# Patient Record
Sex: MALE | Race: WHITE | NOT HISPANIC OR LATINO | URBAN - METROPOLITAN AREA
[De-identification: names, ages, dates, MRNs, and addresses within clinical notes are randomized per-mention and may not be internally consistent; named-entity substitution may affect disease eponyms.]

---

## 2017-04-10 ENCOUNTER — OUTPATIENT (OUTPATIENT)
Dept: OUTPATIENT SERVICES | Facility: HOSPITAL | Age: 62
LOS: 1 days | End: 2017-04-10
Payer: OTHER MISCELLANEOUS

## 2017-04-10 VITALS
RESPIRATION RATE: 16 BRPM | DIASTOLIC BLOOD PRESSURE: 80 MMHG | TEMPERATURE: 97 F | HEIGHT: 62 IN | WEIGHT: 162.04 LBS | SYSTOLIC BLOOD PRESSURE: 122 MMHG | HEART RATE: 61 BPM

## 2017-04-10 DIAGNOSIS — Z98.89 OTHER SPECIFIED POSTPROCEDURAL STATES: Chronic | ICD-10-CM

## 2017-04-10 DIAGNOSIS — I10 ESSENTIAL (PRIMARY) HYPERTENSION: ICD-10-CM

## 2017-04-10 DIAGNOSIS — M75.121 COMPLETE ROTATOR CUFF TEAR OR RUPTURE OF RIGHT SHOULDER, NOT SPECIFIED AS TRAUMATIC: ICD-10-CM

## 2017-04-10 DIAGNOSIS — M66.819: ICD-10-CM

## 2017-04-10 LAB
BUN SERPL-MCNC: 26 MG/DL — HIGH (ref 7–23)
CALCIUM SERPL-MCNC: 9.8 MG/DL — SIGNIFICANT CHANGE UP (ref 8.4–10.5)
CHLORIDE SERPL-SCNC: 102 MMOL/L — SIGNIFICANT CHANGE UP (ref 98–107)
CO2 SERPL-SCNC: 26 MMOL/L — SIGNIFICANT CHANGE UP (ref 22–31)
CREAT SERPL-MCNC: 0.91 MG/DL — SIGNIFICANT CHANGE UP (ref 0.5–1.3)
GLUCOSE SERPL-MCNC: 106 MG/DL — HIGH (ref 70–99)
HCT VFR BLD CALC: 48.6 % — SIGNIFICANT CHANGE UP (ref 39–50)
HGB BLD-MCNC: 16.6 G/DL — SIGNIFICANT CHANGE UP (ref 13–17)
MCHC RBC-ENTMCNC: 32.4 PG — SIGNIFICANT CHANGE UP (ref 27–34)
MCHC RBC-ENTMCNC: 34.2 % — SIGNIFICANT CHANGE UP (ref 32–36)
MCV RBC AUTO: 94.7 FL — SIGNIFICANT CHANGE UP (ref 80–100)
PLATELET # BLD AUTO: 192 K/UL — SIGNIFICANT CHANGE UP (ref 150–400)
PMV BLD: 12 FL — SIGNIFICANT CHANGE UP (ref 7–13)
POTASSIUM SERPL-MCNC: 4.3 MMOL/L — SIGNIFICANT CHANGE UP (ref 3.5–5.3)
POTASSIUM SERPL-SCNC: 4.3 MMOL/L — SIGNIFICANT CHANGE UP (ref 3.5–5.3)
RBC # BLD: 5.13 M/UL — SIGNIFICANT CHANGE UP (ref 4.2–5.8)
RBC # FLD: 14 % — SIGNIFICANT CHANGE UP (ref 10.3–14.5)
SODIUM SERPL-SCNC: 144 MMOL/L — SIGNIFICANT CHANGE UP (ref 135–145)
WBC # BLD: 7.36 K/UL — SIGNIFICANT CHANGE UP (ref 3.8–10.5)
WBC # FLD AUTO: 7.36 K/UL — SIGNIFICANT CHANGE UP (ref 3.8–10.5)

## 2017-04-10 PROCEDURE — 93010 ELECTROCARDIOGRAM REPORT: CPT

## 2017-04-10 NOTE — H&P PST ADULT - NEUROLOGICAL DETAILS
responds to verbal commands/alert and oriented x 3/sensation intact/responds to pain/normal strength

## 2017-04-10 NOTE — H&P PST ADULT - NEGATIVE ENMT SYMPTOMS
no hearing difficulty/no vertigo/no gum bleeding/no dysphagia/no throat pain/no nasal discharge/no sinus symptoms/no ear pain/no nasal congestion/no nasal obstruction/no tinnitus

## 2017-04-10 NOTE — H&P PST ADULT - LOCATION OF BACK PAIN
Pt c/o of chronic lower back pain - herniated disc L4-5 - with occasional radiation to right leg/lumbar spine Pt c/o of chronic lower back pain - herniated disc L4-5 - with radiation to right leg/lumbar spine

## 2017-04-10 NOTE — H&P PST ADULT - PROBLEM SELECTOR PLAN 1
Pt given pre-op instructions and chlorhexidine and to take own GI protection.   OR booking notified of Valium allergy and MADELIN precautions via fax.   Stress and Echo from 2016 requested.

## 2017-04-10 NOTE — H&P PST ADULT - MUSCULOSKELETAL COMMENTS
Pt hx of chronic shoulder pain and s/p right shoulder rotator cuff repair 11/16 with continued pain and difficulty raising arm Chronic shoulder pain with reduced ROM and lower back pain with radiation to right leg and mild weakness

## 2017-04-10 NOTE — H&P PST ADULT - HISTORY OF PRESENT ILLNESS
Pt is a 61 yr old male scheduled for right shoulder Arthroscopy Rotator Cuff repair with Dr Salas 4/17/17. Pt S/P right shoulder surgery 11/17 and continued to have pain after PT and especially at night. Pt has difficulty with raising arm and reaching to back. Pain is 6/10 with movement. Pt Hx of chronic lower back pain 6/10 with occasional radiation to right leg

## 2017-04-10 NOTE — H&P PST ADULT - OPHTHALMOLOGIC COMMENTS
Hx of loss of vision right eye at age 9 - sees only "shadows" - Left eye vision is corrected to 20/20

## 2017-04-10 NOTE — H&P PST ADULT - NEGATIVE NEUROLOGICAL SYMPTOMS
no tremors/no weakness/no headache/no generalized seizures/no syncope/no transient paralysis/no loss of consciousness/no difficulty walking/no confusion/no hemiparesis/no focal seizures/no paresthesias no loss of consciousness/no tremors/no hemiparesis/no transient paralysis/no syncope/no difficulty walking/no generalized seizures/no focal seizures/no confusion/no headache/no paresthesias

## 2017-04-10 NOTE — H&P PST ADULT - PMH
GERD (gastroesophageal reflux disease)    Hiatal hernia    Hyperlipemia    Hypertension    Spontaneous rupture of tendon of shoulder

## 2017-04-10 NOTE — H&P PST ADULT - CARDIOVASCULAR COMMENTS
Pt had Stress/ Echo and angiogram last fall and cleared for surgery 11/16 - pt denies chest pain or SOB

## 2017-04-10 NOTE — H&P PST ADULT - PSH
H/O hammer toe correction  right foot 2010  History of Non-Cataract Eye Surgery  right eye surgery as a child X2, (only see shadow)  S/P rotator cuff repair  right 1996, 1997, left 2010 H/O hammer toe correction  right foot 2010  History of Non-Cataract Eye Surgery  right eye surgery as a child X2, (only see shadow)  S/P rotator cuff repair  right 1996, 1997, 2016 left 2010

## 2017-04-10 NOTE — H&P PST ADULT - NSANTHOSAYNRD_GEN_A_CORE
denies testing/No. MADELIN screening performed.  STOP BANG Legend: 0-2 = LOW Risk; 3-4 = INTERMEDIATE Risk; 5-8 = HIGH Risk

## 2017-04-17 ENCOUNTER — OUTPATIENT (OUTPATIENT)
Dept: OUTPATIENT SERVICES | Facility: HOSPITAL | Age: 62
LOS: 1 days | Discharge: HOME | End: 2017-04-17

## 2017-04-17 VITALS
RESPIRATION RATE: 16 BRPM | DIASTOLIC BLOOD PRESSURE: 66 MMHG | HEART RATE: 75 BPM | SYSTOLIC BLOOD PRESSURE: 124 MMHG | OXYGEN SATURATION: 98 % | TEMPERATURE: 97 F

## 2017-04-17 VITALS
OXYGEN SATURATION: 96 % | HEART RATE: 66 BPM | HEIGHT: 62 IN | DIASTOLIC BLOOD PRESSURE: 66 MMHG | RESPIRATION RATE: 16 BRPM | SYSTOLIC BLOOD PRESSURE: 128 MMHG | WEIGHT: 162.04 LBS | TEMPERATURE: 97 F

## 2017-04-17 DIAGNOSIS — M75.121 COMPLETE ROTATOR CUFF TEAR OR RUPTURE OF RIGHT SHOULDER, NOT SPECIFIED AS TRAUMATIC: ICD-10-CM

## 2017-04-17 DIAGNOSIS — Z98.89 OTHER SPECIFIED POSTPROCEDURAL STATES: Chronic | ICD-10-CM

## 2017-04-17 NOTE — ASU DISCHARGE PLAN (ADULT/PEDIATRIC). - NOTIFY
Fever greater than 101/Persistent Nausea and Vomiting/Bleeding that does not stop/Numbness, color, or temperature change to extremity/Unable to Urinate/Swelling that continues/Numbness, tingling/Pain not relieved by Medications

## 2017-04-17 NOTE — ASU DISCHARGE PLAN (ADULT/PEDIATRIC). - ASU FOLLOWUP
[facility] ([facility]):

## 2022-07-21 NOTE — ASU PREOPERATIVE ASSESSMENT, ADULT (IPARK ONLY) - POST OP PHONE #
or  Glycopyrrolate Counseling:  I discussed with the patient the risks of glycopyrrolate including but not limited to skin rash, drowsiness, dry mouth, difficulty urinating, and blurred vision.

## 2024-10-03 ENCOUNTER — APPOINTMENT (OUTPATIENT)
Age: 69
End: 2024-10-03

## 2024-10-03 ENCOUNTER — NON-APPOINTMENT (OUTPATIENT)
Age: 69
End: 2024-10-03

## 2024-10-03 ENCOUNTER — APPOINTMENT (OUTPATIENT)
Age: 69
End: 2024-10-03
Payer: MEDICARE

## 2024-10-03 PROCEDURE — ZZZZZ: CPT

## 2024-10-03 PROCEDURE — 92015 DETERMINE REFRACTIVE STATE: CPT

## 2024-10-03 PROCEDURE — 92002 INTRM OPH EXAM NEW PATIENT: CPT

## 2025-01-03 ENCOUNTER — NON-APPOINTMENT (OUTPATIENT)
Age: 70
End: 2025-01-03

## 2025-01-03 ENCOUNTER — APPOINTMENT (OUTPATIENT)
Age: 70
End: 2025-01-03
Payer: MEDICARE

## 2025-01-03 PROCEDURE — 92015 DETERMINE REFRACTIVE STATE: CPT

## 2025-01-03 PROCEDURE — 92134 CPTRZ OPH DX IMG PST SGM RTA: CPT

## 2025-01-03 PROCEDURE — 92014 COMPRE OPH EXAM EST PT 1/>: CPT

## 2025-01-07 PROBLEM — Z00.00 ENCOUNTER FOR PREVENTIVE HEALTH EXAMINATION: Status: ACTIVE | Noted: 2025-01-07

## 2025-04-30 NOTE — ASU PREOP CHECKLIST - INTERNAL PROSTHESES
Physical Therapy   Initial Evaluation     Patient Name: Edy Bennett  Age:  75 y.o., Sex:  male  Medical Record #: [id number]  Today's Date: 4/30/2025     Precautions  Precautions: Fall Risk;Other (See Comments)  Comments: Peritoneal dialysis, EF 20%    Assessment  Patient is 75 y.o. male who presents to hospital on 4/28/2025 with presyncope in the setting of multiple medical issues.    Currently been managed for Postural dizziness with presyncope, muscle twitching     PMH: HFrEF,end-stage renal disease on peritoneal dialysis, peripheral vascular disease status post bypass,coronary disease, aortic stenosis, DLP, A flutter     Patient seen for PT evaluation. Patient in bed, agreeable for the session. Able to demonstrate functional mobility tasks as detailed below. Will continue to benefit from PT services to help improve overall functional mobility. Recommend  PT at this time.     Plan    Physical Therapy Initial Treatment Plan   Treatment Plan : Equipment, Gait Training, Neuro Re-Education / Balance, Stair Training, Therapeutic Activities, Therapeutic Exercise  Treatment Frequency: 2 Times per Week  Duration: Until Therapy Goals Met    DC Equipment Recommendations: None (Owns FWW, 4WW)  Discharge Recommendations: Recommend home health for continued physical therapy services     Objective     04/30/25 1130   Time In/Time Out   Therapy Start Time 1106   Therapy End Time 1130   Total Therapy Time 24   Initial Contact Note    Initial Contact Note Order Received and Verified, Physical Therapy Evaluation in Progress with Full Report to Follow.   Precautions   Precautions Fall Risk;Other (See Comments)   Comments Peritoneal dialysis, EF 20%   Vitals   Pulse 85   Patient BP Position Sitting  (EOB, post ambulation from bathroom)   Blood Pressure  129/60   Pulse Oximetry 99 %   O2 Delivery Device None - Room Air   Vitals Comments Post ambulation in hallway, seated in the chair: /67, HR 71, Sp02 100   Pain   Pain  Scales 0 to 10 Scale    Intervention Declines   Pain 0 - 10 Group   Therapist Pain Assessment Prior to Activity;During Activity;Post Activity   Prior Living Situation   Prior Services Intermittent Physical Support for ADL Per Family;Skilled Home Health Services  (HH PT 1x/week, HH Wound care RN 1x/week)   Housing / Facility 1 Story House   Steps Into Home   (1 step curb)   Equipment Owned Front-Wheel Walker;4-Wheel Walker   Lives with - Patient's Self Care Capacity Spouse   Comments Spouse in the room mentioned that she is able to provide assistance as needed. She helps him with peritoneal dialysis.   Prior Level of Functional Mobility   Bed Mobility Independent   Transfer Status Independent   Ambulation Independent   Ambulation Distance Limited community   Assistive Devices Used Front-Wheel Walker   Stairs Independent   Comments Patient uses FWW-indoors as needed; 4WW-outdoors; he sometimes does furniture cruising in the house where FWW does not fit properly.   History of Falls   History of Falls No   Cognition    Cognition / Consciousness X   Speech/ Communication Hard of Hearing  (B/L hearing aids (+))   Level of Consciousness Alert   Active ROM Lower Body    Comments Grossly BLE WFL   Strength Lower Body   Comments Grossly BLE WFL   Vision   Vision Comments Glasses-reading   Other Treatments   Other Treatments Provided Patient and spouse were educated on walking program, guideline on gradually progressing with distance and time of walking, activity pacing during mobility. Patient had recent H/O orthostatic hypotension in-house, educated to continue monitoring BP at home, maintaining each position for brief minute prior to changing positions. Discussed about DC recommendations-agreeable to continue with HH PT at this time. Reinforced to continue using FWW for walking at this time. Educated on importance of daily & frequent mobility, OOB to chair for meals and ambulate with supervision from nursing staff. Patient  had no FWW in his room, placed a FWW in patient's room. Supervised to the bathroom to void, performed hand hygiene by the sink.   Balance Assessment   Sitting Balance (Static) Fair +   Sitting Balance (Dynamic) Fair +   Standing Balance (Static) Fair   Standing Balance (Dynamic) Fair   Weight Shift Sitting Good   Weight Shift Standing Fair   Comments Seated EOB; Standing with FWW   Bed Mobility    Supine to Sit Supervised   Scooting Supervised  (Towards EOB)   Rolling Supervised   Comments HOB flat, without use of rails, towards R side   Gait Analysis   Gait Level Of Assist Standby Assist   Assistive Device Front Wheel Walker   Distance (Feet) 120  (1 seated rest break)   Deviation Bradykinetic;Decreased Base Of Support;Other (Comment)  (forward trunk posture, reduced step & stride length)   Comments Cues for upright trunk posture, pacing, appropriate distance b/w self and AD   Functional Mobility   Sit to Stand Standby Assist   Bed, Chair, Wheelchair Transfer Standby Assist   Toilet Transfers Standby Assist   Transfer Method Stand Step   Mobility Bed-EOB-sit-stand-ambulate to bathroom-toilet transfer-hand hygiene by sink-ambulate in the room-EOB sitting-sit-stand-ambulate in the hallway-EOB-sit-stand-steps to chair   Comments W/O AD; W FWW; cues for LE placement   6 Clicks Assessment - How much HELP from from another person do you currently need... (If the patient hasn't done an activity recently, how much help from another person do you think he/she would need if he/she tried?)   Turning from your back to your side while in a flat bed without using bedrails? 4   Moving from lying on your back to sitting on the side of a flat bed without using bedrails? 4   Moving to and from a bed to a chair (including a wheelchair)? 3   Standing up from a chair using your arms (e.g., wheelchair, or bedside chair)? 3   Walking in hospital room? 3   Climbing 3-5 steps with a railing? 3   6 clicks Mobility Score 20   Activity  Tolerance   Sitting in Chair Post session   Edema / Skin Assessment   Edema / Skin  X   Comments LE wounds, LE edema   Patient / Family Goals    Patient / Family Goal #1 To return to prior level of functional mobility   Short Term Goals    Short Term Goal # 1 Patient will negotiate 1 step with LRAD with supervision in 6 visits to safely get in & out home   Short Term Goal # 2 Patient will perform sit-stand with LRAD with supervision in 6 visits to progress with functional mobility   Short Term Goal # 3 Patient will perform chair transfers with LRAD with supervision in 6 visits to safely get OOB to chair   Short Term Goal # 4 Patient will ambulate 150 feet with LRAD with supervision in 6 visits to safely ambulate household & community distance   Education Group   Education Provided Role of Physical Therapist   Role of Physical Therapist Patient Response Patient;Significant Other;Acceptance;Explanation;Verbal Demonstration   Physical Therapy Initial Treatment Plan    Treatment Plan  Equipment;Gait Training;Neuro Re-Education / Balance;Stair Training;Therapeutic Activities;Therapeutic Exercise   Treatment Frequency 2 Times per Week   Duration Until Therapy Goals Met   Problem List    Problems Impaired Transfers;Impaired Ambulation;Impaired Balance   Anticipated Discharge Equipment and Recommendations   DC Equipment Recommendations None  (Owns FWW, 4WW)   Discharge Recommendations Recommend home health for continued physical therapy services   Interdisciplinary Plan of Care Collaboration   IDT Collaboration with  Nursing;Certified Nursing Assistant;  (Resident)   Patient Position at End of Therapy Call Light within Reach;Tray Table within Reach;Seated;Chair Alarm On;Family / Friend in Room   Session Information   Date / Session Number  4/30-1(1/2, 5/6)   Priority   (DC home, 1STE)          no

## 2025-05-13 NOTE — H&P PST ADULT - LAST PROSTATE EXAM
Last Written Prescription:  amLODIPine (NORVASC) 10 MG tablet 90 tablet 3 2/2/2024     ----------------------  Last Visit Date: 2/12/25  Future Visit Date: none  ----------------------      Refill decision: Medication refilled per  Medication Refill in Ambulatory Care  policy.  A one-time only 90-day flakita refill given, pt overdue for labs. Elevated BP readings FYI sent to care team.        Request from pharmacy:  Requested Prescriptions   Pending Prescriptions Disp Refills    amLODIPine (NORVASC) 10 MG tablet [Pharmacy Med Name: amLODIPine Besylate Oral Tablet 10 MG] 90 tablet 0     Sig: TAKE ONE TABLET BY MOUTH ONCE DAILY       Calcium Channel Blockers Protocol  Failed - 5/13/2025 11:57 AM        Failed - Most recent blood pressure under 140/90 in past 12 months     BP Readings from Last 3 Encounters:   02/12/25 (!) 164/86   04/22/24 136/71   02/02/24 (!) 142/82       No data recorded            Failed - GFR is on file in the past 12 months and most recent GFR is normal        Passed - Medication is active on med list and the sig matches. RN to manually verify dose and sig if red X/fail.     If the protocol passes (green check), you do not need to verify med dose and sig.    A prescription matches if they are the same clinical intention.    For Example: once daily and every morning are the same.    The protocol can not identify upper and lower case letters as matching and will fail.     For Example: Take 1 tablet (50 mg) by mouth daily     TAKE 1 TABLET (50 MG) BY MOUTH DAILY    For all fails (red x), verify dose and sig.    If the refill does match what is on file, the RN can still proceed to approve the refill request.       If they do not match, route to the appropriate provider.             Passed - Medication is indicated for associated diagnosis        Passed - Recent (12 mo) or future (90 days) visit within the authorizing provider's specialty     The patient must have completed an in-person or virtual  visit within the past 12 months or has a future visit scheduled within the next 90 days with the authorizing provider s specialty.  Urgent care and e-visits do not qualify as an office visit for this protocol.          Passed - Patient is age 18 or older            2017